# Patient Record
Sex: MALE | URBAN - METROPOLITAN AREA
[De-identification: names, ages, dates, MRNs, and addresses within clinical notes are randomized per-mention and may not be internally consistent; named-entity substitution may affect disease eponyms.]

---

## 2021-01-04 LAB — PHQ9 SCORE: 19

## 2021-01-27 LAB — PHQ9 SCORE: 17

## 2021-03-23 LAB — PHQ9 SCORE: 19

## 2021-04-20 LAB — PHQ9 SCORE: 22

## 2021-05-18 LAB — PHQ9 SCORE: 23

## 2021-06-22 LAB — PHQ9 SCORE: 24

## 2021-07-07 LAB — PHQ9 SCORE: 23

## 2021-07-28 LAB — PHQ9 SCORE: 24

## 2021-09-08 LAB — PHQ9 SCORE: 22

## 2021-11-10 LAB — PHQ9 SCORE: 17

## 2021-11-30 ENCOUNTER — TRANSFERRED RECORDS (OUTPATIENT)
Dept: HEALTH INFORMATION MANAGEMENT | Facility: CLINIC | Age: 61
End: 2021-11-30

## 2021-11-30 LAB — PHQ9 SCORE: 22

## 2022-07-01 ENCOUNTER — TELEPHONE (OUTPATIENT)
Dept: PSYCHIATRY | Facility: CLINIC | Age: 62
End: 2022-07-01

## 2022-07-01 NOTE — TELEPHONE ENCOUNTER
Received referral from Dr. Louei Rodriguez at [facility].    DX:  Resistant major depression    Patient with resistant MDD several failed medication trials due to lack of effect and intolerable side effects.  Recently declined by [facility] for rTMS     Referred to Dr. Mccartney for ECT.

## 2022-10-12 NOTE — TELEPHONE ENCOUNTER
Spoke to patient's wifeabout TRD scheduling, patient no longer interested at this time as he's already received ECT elsewhere.